# Patient Record
Sex: MALE | Race: WHITE | NOT HISPANIC OR LATINO | Employment: UNEMPLOYED | URBAN - METROPOLITAN AREA
[De-identification: names, ages, dates, MRNs, and addresses within clinical notes are randomized per-mention and may not be internally consistent; named-entity substitution may affect disease eponyms.]

---

## 2019-02-12 ENCOUNTER — OFFICE VISIT (OUTPATIENT)
Dept: FAMILY MEDICINE | Facility: CLINIC | Age: 39
End: 2019-02-12
Payer: COMMERCIAL

## 2019-02-12 VITALS
WEIGHT: 209 LBS | RESPIRATION RATE: 14 BRPM | SYSTOLIC BLOOD PRESSURE: 122 MMHG | HEIGHT: 71 IN | DIASTOLIC BLOOD PRESSURE: 85 MMHG | BODY MASS INDEX: 29.26 KG/M2 | OXYGEN SATURATION: 97 % | TEMPERATURE: 97.6 F | HEART RATE: 71 BPM

## 2019-02-12 DIAGNOSIS — Z00.00 ENCOUNTER FOR ROUTINE ADULT HEALTH EXAMINATION WITHOUT ABNORMAL FINDINGS: Primary | ICD-10-CM

## 2019-02-12 DIAGNOSIS — F43.21 ADJUSTMENT DISORDER WITH DEPRESSED MOOD: ICD-10-CM

## 2019-02-12 DIAGNOSIS — Z80.0 FAMILY HISTORY OF COLON CANCER: ICD-10-CM

## 2019-02-12 DIAGNOSIS — Z23 VACCINE FOR DIPHTHERIA-TETANUS-PERTUSSIS, COMBINED: ICD-10-CM

## 2019-02-12 DIAGNOSIS — J30.1 SEASONAL ALLERGIC RHINITIS DUE TO POLLEN: ICD-10-CM

## 2019-02-12 PROBLEM — J30.2 SEASONAL ALLERGIC RHINITIS: Status: ACTIVE | Noted: 2019-02-12

## 2019-02-12 PROCEDURE — 80061 LIPID PANEL: CPT | Performed by: FAMILY MEDICINE

## 2019-02-12 PROCEDURE — 90715 TDAP VACCINE 7 YRS/> IM: CPT | Performed by: FAMILY MEDICINE

## 2019-02-12 PROCEDURE — 36415 COLL VENOUS BLD VENIPUNCTURE: CPT | Performed by: FAMILY MEDICINE

## 2019-02-12 PROCEDURE — 83721 ASSAY OF BLOOD LIPOPROTEIN: CPT | Performed by: FAMILY MEDICINE

## 2019-02-12 PROCEDURE — 80053 COMPREHEN METABOLIC PANEL: CPT | Performed by: FAMILY MEDICINE

## 2019-02-12 PROCEDURE — 90471 IMMUNIZATION ADMIN: CPT | Performed by: FAMILY MEDICINE

## 2019-02-12 PROCEDURE — 99386 PREV VISIT NEW AGE 40-64: CPT | Mod: 25 | Performed by: FAMILY MEDICINE

## 2019-02-12 ASSESSMENT — MIFFLIN-ST. JEOR: SCORE: 1872.21

## 2019-02-12 NOTE — LETTER
March 1, 2019      Yg PIKE Tamicashanawiak  [address]   [facility] [address]        Dear ,    We are writing to inform you of your test results.    -LDL(bad) cholesterol level is elevated, HDL(good) cholesterol level is low and your triglycerides are elevated which can increase your heart disease risk.  A diet high in fat and simple carbohydrates, genetics and being overweight can contribute to this. ADVISE: exercising 150 minutes of aerobic exercise per week (30 minutes for 5 days per week or 50 minutes for 3 days per week are options), eating a low saturated fat/low carbohydrate diet, and omega-3 fatty acids (fish oil) 8427-3508 mg daily are helpful to improve this. In 6 months, you should recheck your fasting cholesterol panel by scheduling a lab-only appointment.   -Liver and gallbladder tests are normal (ALT,AST, Alk phos, bilirubin), kidney function is normal (Cr, GFR), sodium is normal, potassium is normal, calcium is normal, glucose is normal.     Resulted Orders   Lipid panel reflex to direct LDL Fasting   Result Value Ref Range    Cholesterol 220 (H) <200 mg/dL      Comment:      Desirable:       <200 mg/dl    Triglycerides 408 (H) <150 mg/dL      Comment:      Borderline high:  150-199 mg/dl  High:             200-499 mg/dl  Very high:       >499 mg/dl  Fasting specimen      HDL Cholesterol 30 (L) >39 mg/dL    LDL Cholesterol Calculated  <100 mg/dL     Cannot estimate LDL when triglyceride exceeds 400 mg/dL    Non HDL Cholesterol 190 (H) <130 mg/dL      Comment:      Above Desirable:  130-159 mg/dl  Borderline high:  160-189 mg/dl  High:             190-219 mg/dl  Very high:       >219 mg/dl     Comprehensive metabolic panel (BMP + Alb, Alk Phos, ALT, AST, Total. Bili, TP)   Result Value Ref Range    Sodium 138 133 - 144 mmol/L    Potassium 4.5 3.4 - 5.3 mmol/L    Chloride 106 94 - 109 mmol/L    Carbon Dioxide 28 20 - 32 mmol/L    Anion Gap 4 3 - 14 mmol/L    Glucose 99 70 - 99  mg/dL      Comment:      Fasting specimen    Urea Nitrogen 11 7 - 30 mg/dL    Creatinine 0.88 0.66 - 1.25 mg/dL    GFR Estimate >90 >60 mL/min/[1.73_m2]      Comment:      Non  GFR Calc  Starting 12/18/2018, serum creatinine based estimated GFR (eGFR) will be   calculated using the Chronic Kidney Disease Epidemiology Collaboration   (CKD-EPI) equation.      GFR Estimate If Black >90 >60 mL/min/[1.73_m2]      Comment:       GFR Calc  Starting 12/18/2018, serum creatinine based estimated GFR (eGFR) will be   calculated using the Chronic Kidney Disease Epidemiology Collaboration   (CKD-EPI) equation.      Calcium 9.1 8.5 - 10.1 mg/dL    Bilirubin Total 0.5 0.2 - 1.3 mg/dL    Albumin 4.4 3.4 - 5.0 g/dL    Protein Total 7.7 6.8 - 8.8 g/dL    Alkaline Phosphatase 97 40 - 150 U/L    ALT 27 0 - 70 U/L    AST 21 0 - 45 U/L   LDL cholesterol direct   Result Value Ref Range    LDL Cholesterol Direct 130 (H) <100 mg/dL      Comment:      Above desirable:  100-129 mg/dl  Borderline High:  130-159 mg/dL  High:             160-189 mg/dL  Very high:       >189 mg/dl         If you have any questions or concerns, please call the clinic at the number listed above.       Sincerely,        Madelin Rm, DO

## 2019-02-12 NOTE — LETTER
My Depression Action Plan  Name: Yg Garza   Date of Birth 1980  Date: 2/12/2019    My doctor: No primary care provider on file.   My clinic: [facility]  [address] [place] [id number] [phone]          GREEN    ZONE   Good Control    What it looks like:     Things are going generally well. You have normal up s and down s. You may even feel depressed from time to time, but bad moods usually last less than a day.   What you need to do:  1. Continue to care for yourself (see self care plan)  2. Check your depression survival kit and update it as needed  3. Follow your physician s recommendations including any medication.  4. Do not stop taking medication unless you consult with your physician first.           YELLOW         ZONE Getting Worse    What it looks like:     Depression is starting to interfere with your life.     It may be hard to get out of bed; you may be starting to isolate yourself from others.    Symptoms of depression are starting to last most all day and this has happened for several days.     You may have suicidal thoughts but they are not constant.   What you need to do:     1. Call your care team, your response to treatment will improve if you keep your care team informed of your progress. Yellow periods are signs an adjustment may need to be made.     2. Continue your self-care, even if you have to fake it!    3. Talk to someone in your support network    4. Open up your depression survival kit           RED    ZONE Medical Alert - Get Help    What it looks like:     Depression is seriously interfering with your life.     You may experience these or other symptoms: You can t get out of bed most days, can t work or engage in other necessary activities, you have trouble taking care of basic hygiene, or basic responsibilities, thoughts of suicide or death that will not go away, self-injurious behavior.     What you need to  do:  1. Call your care team and request a same-day appointment. If they are not available (weekends or after hours) call your local crisis line, emergency room or 911.            Depression Self Care Plan / Survival Kit    Self-Care for Depression  Here s the deal. Your body and mind are really not as separate as most people think.  What you do and think affects how you feel and how you feel influences what you do and think. This means if you do things that people who feel good do, it will help you feel better.  Sometimes this is all it takes.  There is also a place for medication and therapy depending on how severe your depression is, so be sure to consult with your medical provider and/ or Behavioral Health Consultant if your symptoms are worsening or not improving.     In order to better manage my stress, I will:    Exercise  Get some form of exercise, every day. This will help reduce pain and release endorphins, the  feel good  chemicals in your brain. This is almost as good as taking antidepressants!  This is not the same as joining a gym and then never going! (they count on that by the way ) It can be as simple as just going for a walk or doing some gardening, anything that will get you moving.      Hygiene   Maintain good hygiene (Get out of bed in the morning, Make your bed, Brush your teeth, Take a shower, and Get dressed like you were going to work, even if you are unemployed).  If your clothes don't fit try to get ones that do.    Diet  I will strive to eat foods that are good for me, drink plenty of water, and avoid excessive sugar, caffeine, alcohol, and other mood-altering substances.  Some foods that are helpful in depression are: complex carbohydrates, B vitamins, flaxseed, fish or fish oil, fresh fruits and vegetables.    Psychotherapy  I agree to participate in Individual Therapy (if recommended).    Medication  If prescribed medications, I agree to take them.  Missing doses can result in serious  side effects.  I understand that drinking alcohol, or other illicit drug use, may cause potential side effects.  I will not stop my medication abruptly without first discussing it with my provider.    Staying Connected With Others  I will stay in touch with my friends, family members, and my primary care provider/team.    Use your imagination  Be creative.  We all have a creative side; it doesn t matter if it s oil painting, sand castles, or mud pies! This will also kick up the endorphins.    Witness Beauty  (AKA stop and smell the roses) Take a look outside, even in mid-winter. Notice colors, textures. Watch the squirrels and birds.     Service to others  Be of service to others.  There is always someone else in need.  By helping others we can  get out of ourselves  and remember the really important things.  This also provides opportunities for practicing all the other parts of the program.    Humor  Laugh and be silly!  Adjust your TV habits for less news and crime-drama and more comedy.    Control your stress  Try breathing deep, massage therapy, biofeedback, and meditation. Find time to relax each day.     My support system    Clinic Contact:  Phone number:    Contact 1:  Phone number:    Contact 2:  Phone number:    Worship/:  Phone number:    Therapist:  Phone number:    Local crisis center:    Phone number:    Other community support:  Phone number:

## 2019-02-12 NOTE — PROGRESS NOTES
SUBJECTIVE:   CC: Yg Garza is an 38 year old male who presents for preventative health visit.     Question 2/12/2019  1:57 PM CST   Please respond to the following questions regarding your annual exam.  These will be reviewed by your provider at the time of your appointment and will become a permanent part of your medical record.     Outside of work, how many days during the week do you exercise? 4-5 days/week   If you drink alcohol do you typically have greater than 3 drinks per day OR greater than 7 drinks per week? No   Do you get at least 3 servings of foods that have calcium each day (dairy, green leafy vegetables, etc)? No   Do you have a special diet?  breakfast skipped   Do you have any problems taking medications regularly? No   Side effects from medication: None   Have you had an eye exam in the past two years? No   Do you see a dentist twice per year? No   Do you have sleep apnea, excessive snoring or daytime drowsiness? Excessive snoring   Over the past 2 weeks, how often have you been bothered by any of the following problems?    Q1: Little interest or pleasure in doing things More than half the days   Q2: Feeling down, depressed or hopeless Nearly every day   Do you have any additional concerns to address? No   PHQ-2 Score (range: 0 - 6) 5   Z Fv Branch Annual Exercise Outside Of Work     Question 2/12/2019  1:56 PM CST   Outside of work, approximately how many minutes a day do you exercise? 30-45 minutes   Phq-9 (Phq-9)-Developed By Kiah Anderson,Sandra Samuel,Anoop Lacy And Colleagues,With An Educational Bentley From [employer] 2002.     Question 2/12/2019  1:58 PM CST   Over the last 2 weeks, how often have you been bothered by any of the following problems?    1. Little interest or pleasure in doing things More than half the days   2.  Feeling down, depressed, or hopeless Nearly every day   3.  Trouble falling or staying asleep, or sleeping too much More than half the days    4.  Feeling tired or having little energy Not at all   5.  Poor appetite or overeating More than half the days   6.  Feeling bad about yourself - or that you are a failure or have let yourself or your family down Nearly every day   7.  Trouble concentrating on things, such as reading the newspaper or watching television Not at all   8.  Moving or speaking so slowly that other people could have noticed. Or the opposite - being so fidgety or restless that you have been moving around a lot more than usual Not at all   9.  Thoughts that you would be better off dead, or of hurting yourself in some way Nearly every day   If you checked off any problems, how difficult have these problems made it for you to do your work, take care of things at home, or get along with other people? Somewhat difficult   PHQ9 TOTAL SCORE (range: 0 - 27) 15 (Moderately severe depression)   Z Fv Branch Suicidal Thoughts     Question 2/12/2019  1:58 PM CST   In the past two weeks have you had thoughts of suicide or self harm? No   Do you have concerns about your personal safety or the safety of others? No     Question 2/12/2019  1:57 PM CST   Please respond to the following questions regarding your annual exam.  These will be reviewed by your provider at the time of your appointment and will become a permanent part of your medical record.     Outside of work, how many days during the week do you exercise? 4-5 days/week   If you drink alcohol do you typically have greater than 3 drinks per day OR greater than 7 drinks per week? No   Do you get at least 3 servings of foods that have calcium each day (dairy, green leafy vegetables, etc)? No   Do you have a special diet?  breakfast skipped   Do you have any problems taking medications regularly? No   Side effects from medication: None   Have you had an eye exam in the past two years? No   Do you see a dentist twice per year? No   Do you have sleep apnea, excessive snoring or daytime drowsiness?  Excessive snoring   Over the past 2 weeks, how often have you been bothered by any of the following problems?    Q1: Little interest or pleasure in doing things More than half the days   Q2: Feeling down, depressed or hopeless Nearly every day   Do you have any additional concerns to address? No   PHQ-2 Score (range: 0 - 6) 5   Z Fv Branch Annual Exercise Outside Of Work     Question 2/12/2019  1:56 PM CST   Outside of work, approximately how many minutes a day do you exercise? 30-45 minutes     -------------------------------------    Today's PHQ-2 Score:   PHQ-2 ( 1999 [employer]) 2/12/2019   Q1: Little interest or pleasure in doing things More than half the days   Q2: Feeling down, depressed or hopeless Nearly every day   PHQ-2 Score 5       Abuse: Current or Past(Physical, Sexual or Emotional)- No  Do you feel safe in your environment? Yes    Social History     Tobacco Use     Smoking status: Current Every Day Smoker     Types: Cigarettes     Smokeless tobacco: Never Used   Substance Use Topics     Alcohol use: Yes     Comment: 6 beers a month     No flowsheet data found.    Last PSA: No results found for: PSA    Reviewed orders with patient. Reviewed health maintenance and updated orders accordingly - Yes  Patient Active Problem List   Diagnosis     Seasonal allergic rhinitis     Family history of colon cancer     Past Surgical History:   Procedure Laterality Date     testicular torsion surgery  2004       Social History     Tobacco Use     Smoking status: Current Every Day Smoker     Types: Cigarettes     Smokeless tobacco: Never Used   Substance Use Topics     Alcohol use: Yes     Comment: 6 beers a month     Family History   Problem Relation Age of Onset     Cataracts Mother      Coronary Artery Disease Father         has pacemaker     Abdominal Aortic Aneurysm Father      Colon Cancer Paternal Grandfather         passed away in 60s           Reviewed and updated as needed this visit by clinical staff  Tobacco   "Allergies  Meds  Med Hx  Surg Hx  Fam Hx  Soc Hx        Reviewed and updated as needed this visit by Provider            Review of Systems  CONSTITUTIONAL: NEGATIVE for fever, chills, change in weight  INTEGUMENTARY/SKIN: NEGATIVE for worrisome rashes, moles or lesions  EYES: NEGATIVE for vision changes or irritation  ENT: NEGATIVE for ear, mouth and throat problems  RESP: NEGATIVE for significant cough or SOB  CV: NEGATIVE for chest pain, palpitations or peripheral edema  GI: NEGATIVE for nausea, abdominal pain, heartburn, or change in bowel habits   male: negative for dysuria, hematuria, decreased urinary stream, erectile dysfunction, urethral discharge  MUSCULOSKELETAL: NEGATIVE for significant arthralgias or myalgia  NEURO: NEGATIVE for weakness, dizziness or paresthesias  PSYCHIATRIC: NEGATIVE for changes in mood or affect    OBJECTIVE:   /85   Pulse 71   Temp 97.6  F (36.4  C) (Oral)   Resp 14   Ht 1.791 m (5' 10.5\")   Wt 94.8 kg (209 lb)   SpO2 97%   BMI 29.56 kg/m      Physical Exam  GENERAL: healthy, alert and no distress  EYES: Eyes grossly normal to inspection, PERRL and conjunctivae and sclerae normal  HENT: ear canals and TM's normal, nose and mouth without ulcers or lesions  NECK: no adenopathy, no asymmetry, masses, or scars and thyroid normal to palpation  RESP: lungs clear to auscultation - no rales, rhonchi or wheezes  CV: regular rate and rhythm, normal S1 S2, no S3 or S4, no murmur, click or rub, no peripheral edema and peripheral pulses strong  ABDOMEN: soft, nontender, no hepatosplenomegaly, no masses and bowel sounds normal  MS: no gross musculoskeletal defects noted, no edema  SKIN: no suspicious lesions or rashes  NEURO: Normal strength and tone, mentation intact and speech normal  PSYCH: mentation appears normal, affect normal/bright    Diagnostic Test Results:  Labs pending    ASSESSMENT/PLAN:   1. Encounter for routine adult health examination without abnormal " "findings     - Lipid panel reflex to direct LDL Fasting  - Comprehensive metabolic panel (BMP + Alb, Alk Phos, ALT, AST, Total. Bili, TP)    2. Seasonal allergic rhinitis due to pollen       3. Vaccine for diphtheria-tetanus-pertussis, combined  Given   - TDAP, IM (10 - 64 YRS) - Adacel    4. Adjustment disorder with depressed mood  Referral to therapist -   struggling with mood while out of work.  Has been difficult  - MENTAL HEALTH REFERRAL  - Adult; Outpatient Treatment; Individual/Couples/Family/Group Therapy/Health Psychology; G: [facility] [phone]; We will contact you to schedule the appointment or please call with any questions    5. Family history of colon cancer         COUNSELING:   Reviewed preventive health counseling, as reflected in patient instructions    BP Readings from Last 1 Encounters:   02/12/19 122/85     Estimated body mass index is 29.56 kg/m  as calculated from the following:    Height as of this encounter: 1.791 m (5' 10.5\").    Weight as of this encounter: 94.8 kg (209 lb).    BP Screening:   Last 3 BP Readings:    BP Readings from Last 3 Encounters:   02/12/19 122/85       The following was recommended to the patient:  Re-screen BP within a year and recommended lifestyle modifications  Weight management plan: Discussed healthy diet and exercise guidelines     reports that he has been smoking cigarettes.  he has never used smokeless tobacco.  Tobacco Cessation Action Plan: not addressed today    Counseling Resources:  ATP IV Guidelines  Pooled Cohorts Equation Calculator  FRAX Risk Assessment  ICSI Preventive Guidelines  Dietary Guidelines for Americans, 2010  USDA's MyPlate  ASA Prophylaxis  Lung CA Screening    Madelin Rm, DO  [facility]  "

## 2019-02-12 NOTE — NURSING NOTE
"Chief Complaint   Patient presents with     Physical       Initial /85   Pulse 71   Temp 97.6  F (36.4  C) (Oral)   Resp 14   Ht 1.791 m (5' 10.5\")   Wt 94.8 kg (209 lb)   SpO2 97%   BMI 29.56 kg/m   Estimated body mass index is 29.56 kg/m  as calculated from the following:    Height as of this encounter: 1.791 m (5' 10.5\").    Weight as of this encounter: 94.8 kg (209 lb).  BP completed using cuff size: large    Health Maintenance that is potentially due pending provider review:  Health Maintenance Due   Topic Date Due     PHQ-2 Q1 YR  07/22/1990     HIV SCREEN (SYSTEM ASSIGNED)  07/22/1996     DTAP/TDAP/TD IMMUNIZATION (1 - Tdap) 07/22/2003     LIPID SCREEN Q5 YR MALE (SYSTEM ASSIGNED)  07/22/2013     INFLUENZA VACCINE (1) 09/01/2018         Flu vaccine not done this yr  "

## 2019-02-13 LAB
ALBUMIN SERPL-MCNC: 4.4 G/DL (ref 3.4–5)
ALP SERPL-CCNC: 97 U/L (ref 40–150)
ALT SERPL W P-5'-P-CCNC: 27 U/L (ref 0–70)
ANION GAP SERPL CALCULATED.3IONS-SCNC: 4 MMOL/L (ref 3–14)
AST SERPL W P-5'-P-CCNC: 21 U/L (ref 0–45)
BILIRUB SERPL-MCNC: 0.5 MG/DL (ref 0.2–1.3)
BUN SERPL-MCNC: 11 MG/DL (ref 7–30)
CALCIUM SERPL-MCNC: 9.1 MG/DL (ref 8.5–10.1)
CHLORIDE SERPL-SCNC: 106 MMOL/L (ref 94–109)
CHOLEST SERPL-MCNC: 220 MG/DL
CO2 SERPL-SCNC: 28 MMOL/L (ref 20–32)
CREAT SERPL-MCNC: 0.88 MG/DL (ref 0.66–1.25)
GFR SERPL CREATININE-BSD FRML MDRD: >90 ML/MIN/{1.73_M2}
GLUCOSE SERPL-MCNC: 99 MG/DL (ref 70–99)
HDLC SERPL-MCNC: 30 MG/DL
LDLC SERPL CALC-MCNC: ABNORMAL MG/DL
LDLC SERPL DIRECT ASSAY-MCNC: 130 MG/DL
NONHDLC SERPL-MCNC: 190 MG/DL
POTASSIUM SERPL-SCNC: 4.5 MMOL/L (ref 3.4–5.3)
PROT SERPL-MCNC: 7.7 G/DL (ref 6.8–8.8)
SODIUM SERPL-SCNC: 138 MMOL/L (ref 133–144)
TRIGL SERPL-MCNC: 408 MG/DL

## 2019-03-01 NOTE — RESULT ENCOUNTER NOTE
Please send copy of results with a letter:    Enclosed is a copy of your results. If you have any questions, please contact us at [phone].      -LDL(bad) cholesterol level is elevated, HDL(good) cholesterol level is low and your triglycerides are elevated which can increase your heart disease risk.  A diet high in fat and simple carbohydrates, genetics and being overweight can contribute to this. ADVISE: exercising 150 minutes of aerobic exercise per week (30 minutes for 5 days per week or 50 minutes for 3 days per week are options), eating a low saturated fat/low carbohydrate diet, and omega-3 fatty acids (fish oil) 7121-9368 mg daily are helpful to improve this. In 6 months, you should recheck your fasting cholesterol panel by scheduling a lab-only appointment.  -Liver and gallbladder tests are normal (ALT,AST, Alk phos, bilirubin), kidney function is normal (Cr, GFR), sodium is normal, potassium is normal, calcium is normal, glucose is normal.    Thanks,   Madelin Rm DO

## 2021-05-08 ENCOUNTER — HEALTH MAINTENANCE LETTER (OUTPATIENT)
Age: 41
End: 2021-05-08

## 2021-10-23 ENCOUNTER — HEALTH MAINTENANCE LETTER (OUTPATIENT)
Age: 41
End: 2021-10-23

## 2022-06-04 ENCOUNTER — HEALTH MAINTENANCE LETTER (OUTPATIENT)
Age: 42
End: 2022-06-04

## 2022-10-09 ENCOUNTER — HEALTH MAINTENANCE LETTER (OUTPATIENT)
Age: 42
End: 2022-10-09

## 2023-01-15 ASSESSMENT — ENCOUNTER SYMPTOMS
DIARRHEA: 0
HEMATOCHEZIA: 0
MYALGIAS: 0
PARESTHESIAS: 0
CONSTIPATION: 0
EYE PAIN: 0
HEARTBURN: 0
DIZZINESS: 0
SHORTNESS OF BREATH: 0
COUGH: 0
WEAKNESS: 0
CHILLS: 0
FREQUENCY: 0
ABDOMINAL PAIN: 0
SORE THROAT: 0
HEMATURIA: 0
NAUSEA: 0
NERVOUS/ANXIOUS: 0
ARTHRALGIAS: 0
JOINT SWELLING: 0
FEVER: 0
DYSURIA: 0
PALPITATIONS: 0
HEADACHES: 0

## 2023-01-16 ENCOUNTER — OFFICE VISIT (OUTPATIENT)
Dept: FAMILY MEDICINE | Facility: CLINIC | Age: 43
End: 2023-01-16
Payer: COMMERCIAL

## 2023-01-16 VITALS
SYSTOLIC BLOOD PRESSURE: 127 MMHG | BODY MASS INDEX: 31.61 KG/M2 | TEMPERATURE: 98 F | OXYGEN SATURATION: 98 % | WEIGHT: 220.8 LBS | HEART RATE: 93 BPM | DIASTOLIC BLOOD PRESSURE: 91 MMHG | HEIGHT: 70 IN | RESPIRATION RATE: 18 BRPM

## 2023-01-16 DIAGNOSIS — Z11.4 SCREENING FOR HIV (HUMAN IMMUNODEFICIENCY VIRUS): ICD-10-CM

## 2023-01-16 DIAGNOSIS — Z00.00 ROUTINE GENERAL MEDICAL EXAMINATION AT A HEALTH CARE FACILITY: Primary | ICD-10-CM

## 2023-01-16 DIAGNOSIS — Z11.59 NEED FOR HEPATITIS C SCREENING TEST: ICD-10-CM

## 2023-01-16 DIAGNOSIS — Z87.891 EX-SMOKER: ICD-10-CM

## 2023-01-16 DIAGNOSIS — R03.0 ELEVATED BP WITHOUT DIAGNOSIS OF HYPERTENSION: ICD-10-CM

## 2023-01-16 DIAGNOSIS — L72.3 SEBACEOUS CYST: ICD-10-CM

## 2023-01-16 DIAGNOSIS — Z13.220 SCREENING FOR LIPID DISORDERS: ICD-10-CM

## 2023-01-16 DIAGNOSIS — S46.911A STRAIN OF RIGHT SHOULDER, INITIAL ENCOUNTER: ICD-10-CM

## 2023-01-16 DIAGNOSIS — E66.811 CLASS 1 OBESITY DUE TO EXCESS CALORIES WITHOUT SERIOUS COMORBIDITY WITH BODY MASS INDEX (BMI) OF 31.0 TO 31.9 IN ADULT: ICD-10-CM

## 2023-01-16 DIAGNOSIS — E66.09 CLASS 1 OBESITY DUE TO EXCESS CALORIES WITHOUT SERIOUS COMORBIDITY WITH BODY MASS INDEX (BMI) OF 31.0 TO 31.9 IN ADULT: ICD-10-CM

## 2023-01-16 PROCEDURE — 99213 OFFICE O/P EST LOW 20 MIN: CPT | Mod: 25 | Performed by: FAMILY MEDICINE

## 2023-01-16 PROCEDURE — 99386 PREV VISIT NEW AGE 40-64: CPT | Performed by: FAMILY MEDICINE

## 2023-01-16 ASSESSMENT — ENCOUNTER SYMPTOMS
DYSURIA: 0
FEVER: 0
ARTHRALGIAS: 0
FREQUENCY: 0
SHORTNESS OF BREATH: 0
ABDOMINAL PAIN: 0
COUGH: 0
MYALGIAS: 0
DIARRHEA: 0
EYE PAIN: 0
NERVOUS/ANXIOUS: 0
JOINT SWELLING: 0
HEMATURIA: 0
HEMATOCHEZIA: 0
CONSTIPATION: 0
NAUSEA: 0
DIZZINESS: 0
SORE THROAT: 0
PARESTHESIAS: 0
HEARTBURN: 0
WEAKNESS: 0
CHILLS: 0
HEADACHES: 0
PALPITATIONS: 0

## 2023-01-16 ASSESSMENT — PAIN SCALES - GENERAL: PAINLEVEL: MILD PAIN (2)

## 2023-01-16 NOTE — PROGRESS NOTES
SUBJECTIVE:   CC: Jordan is an 42 year old who presents for preventative health visit.     Patient has been advised of split billing requirements and indicates understanding: Yes  Healthy Habits:     Getting at least 3 servings of Calcium per day:  Yes    Bi-annual eye exam:  NO    Dental care twice a year:  Yes    Sleep apnea or symptoms of sleep apnea:  Excessive snoring    Diet:  Other    Frequency of exercise:  2-3 days/week    Duration of exercise:  15-30 minutes    Taking medications regularly:  Yes    Medication side effects:  Not applicable    PHQ-2 Total Score: 0  Right handed.  Dull ache/pain on right shoulder -unaware of acute injury but possibly had injured while opening a patio door back in Oct' 2022.  localized pain on posterior right shoulder.  Intermittent, worse if reaching back, if sleeping on the right shoulder.  No numbness tingling sensation.  No rash or swelling.    He had a resolved lump on right occipital region he would like to look at.      Today's PHQ-2 Score:   PHQ-2 (  [employer]) 1/15/2023   Q1: Little interest or pleasure in doing things 0   Q2: Feeling down, depressed or hopeless 0   PHQ-2 Score 0   Q1: Little interest or pleasure in doing things Not at all   Q2: Feeling down, depressed or hopeless Not at all   PHQ-2 Score 0       Have you ever done Advance Care Planning? (For example, a Health Directive, POLST, or a discussion with a medical provider or your loved ones about your wishes):     Social History     Tobacco Use     Smoking status: Former     Types: Cigarettes     Quit date: 2022     Years since quittin.0     Smokeless tobacco: Never   Substance Use Topics     Alcohol use: Yes     Comment: 6 beers a month     If you drink alcohol do you typically have >3 drinks per day or >7 drinks per week? No    No flowsheet data found.    Last PSA: No results found for: PSA    Reviewed orders with patient. Reviewed health maintenance and updated orders accordingly - Yes  BP  "Readings from Last 3 Encounters:   01/16/23 (!) 127/91   02/12/19 122/85    Wt Readings from Last 3 Encounters:   01/16/23 100.2 kg (220 lb 12.8 oz)   02/12/19 94.8 kg (209 lb)                    Reviewed and updated as needed this visit by clinical staff   Tobacco  Allergies  Meds  Problems  Med Hx  Surg Hx  Fam Hx          Reviewed and updated as needed this visit by Provider   Tobacco  Allergies  Meds  Problems  Med Hx  Surg Hx  Fam Hx             Review of Systems   Constitutional: Negative for chills and fever.   HENT: Negative for congestion, ear pain, hearing loss and sore throat.    Eyes: Negative for pain and visual disturbance.   Respiratory: Negative for cough and shortness of breath.    Cardiovascular: Negative for chest pain, palpitations and peripheral edema.   Gastrointestinal: Negative for abdominal pain, constipation, diarrhea, heartburn, hematochezia and nausea.   Genitourinary: Negative for dysuria, frequency, genital sores, hematuria, impotence, penile discharge and urgency.   Musculoskeletal: Negative for arthralgias, joint swelling and myalgias.   Skin: Negative for rash.   Neurological: Negative for dizziness, weakness, headaches and paresthesias.   Psychiatric/Behavioral: Negative for mood changes. The patient is not nervous/anxious.          OBJECTIVE:   BP (!) 127/91   Pulse 93   Temp 98  F (36.7  C) (Temporal)   Resp 18   Ht 1.789 m (5' 10.43\")   Wt 100.2 kg (220 lb 12.8 oz)   SpO2 98%   BMI 31.29 kg/m      Physical Exam  GENERAL: healthy, alert and no distress  EYES: Eyes grossly normal to inspection, PERRL and conjunctivae and sclerae normal  HENT: ear canals and TM's normal, nose and mouth without ulcers or lesions  NECK: no adenopathy, no asymmetry, masses, or scars and thyroid normal to palpation  RESP: lungs clear to auscultation - no rales, rhonchi or wheezes  CV: regular rate and rhythm, normal S1 S2, no S3 or S4, no murmur, click or rub, no peripheral edema " and peripheral pulses strong  ABDOMEN: soft, nontender, no hepatosplenomegaly, no masses and bowel sounds normal  MS: no gross musculoskeletal defects noted, no edema  SKIN: no suspicious lesions or rashes  NEURO: Normal strength and tone, mentation intact and speech normal  PSYCH: mentation appears normal, affect normal/bright    Diagnostic Test Results:  Labs reviewed in Epic  No results found for this or any previous visit (from the past 24 hour(s)).    ASSESSMENT/PLAN:   Yg was seen today for physical.    Diagnoses and all orders for this visit:    Routine general medical examination at a health care facility    Strain of right shoulder, initial encounter  Plan.  Possibly mild rotator cuff injury.  Start physical therapy.  Avoid reaching overhead or repetitive activity, avoid sleeping on the affected shoulder.  Over-the-counter ibuprofen 4 to 600 mg with meals as needed for next 2 days.  Follow-up in clinic for unresolved concerns in 4 to 6 weeks.  -     Physical Therapy Referral; Future    Elevated BP without diagnosis of hypertension  -     Basic metabolic panel  (Ca, Cl, CO2, Creat, Gluc, K, Na, BUN); Future  Advised to monitor blood pressure on her own.  If recheck blood pressure even at home is more than 140/90 over the course of time specially after lifestyle changes as reiterated and patient instruction office visit or telephone only visit is recommended to review blood pressure and if need start medication for blood pressure more than 140/90.    Ex-smoker  Congratulated him on his successful smoke cessation.  Class 1 obesity due to excess calories without serious comorbidity with body mass index (BMI) of 31.0 to 31.9 in adult  Lifestyle changes to reiterated      Sebaceous cyst  Comments:  right occipital region  Increasing in size excisional biopsy is recommended.      Screening for lipid disorders  -     Lipid panel reflex to direct LDL Fasting; Future    Need for hepatitis C screening test  -      Hepatitis C Screen Reflex to HCV RNA Quant and Genotype; Future    Screening for HIV (human immunodeficiency virus)  -     HIV Antigen Antibody Combo; Future    Other orders  -     REVIEW OF HEALTH MAINTENANCE PROTOCOL ORDERS  -     Cancel: Pneumococcal 20 Valent Conjugate (Prevnar 20)        Patient has been advised of split billing requirements and indicates understanding: Yes      COUNSELING:   Reviewed preventive health counseling, as reflected in patient instructions       Regular exercise       Healthy diet/nutrition       Vision screening       Hearing screening       Safe sex practices/STD prevention       Consider Hep C screening for all patients one time for ages 18-79 years       HIV screeninx in teen years, 1x in adult years, and at intervals if high risk        He reports that he quit smoking about a year ago. His smoking use included cigarettes. He has never used smokeless tobacco.  Nicotine/Tobacco Cessation Plan:   Information offered: Patient not interested at this time        Monika Jovel MD  [facility]

## 2023-01-16 NOTE — PATIENT INSTRUCTIONS
-Normal  BP is  119/79 or lower. Upper number is systolic Blood pressure (SBP). Lower number is diastolic  Blood pressure (DBP)  -Blood pressure between 120-139/80-89 (prehypertensive blood pressure/ class 1 hypertension )   -Hypertensive is  BP of 140/90 or above and needs treatment with medication.  -life style changes that are beneficial to reach goal of normal Blood pressure   1.Weight loss of 1 kg can reduce systolic Blood pressure  (SBP) by 1 mmHg  2.Health healthy diet (eg DASH dietary pattern can reduce SBP by 11 mmHg)  3.Structured excercise program (150 mins aerobic activity/week can reduce SBP by 5 mmHg)  4.Low salt  diet - very important.(eg: cut by 255 or 1000 mg/day to reduce SBP by 5mmHg)  5. Increase 4-5 serving of fresh vegetables & fruits /day- good source of potassium.        if More than 140/90  after a month of above life style changes  Return visit with MD/provider  for recheck & consideration of medications .       Preventive Health Recommendations  Male Ages 40 to 49    Yearly exam:             See your health care provider every year in order to  o   Review health changes.   o   Discuss preventive care.    o   Review your medicines if your doctor has prescribed any.  You should be tested each year for STDs (sexually transmitted diseases) if you re at risk.   Have a cholesterol test every 5 years.   Have a colonoscopy (test for colon cancer) if someone in your family has had colon cancer or polyps before age 50.   After age 45, have a diabetes test (fasting glucose). If you are at risk for diabetes, you should have this test every 3 years.    Talk with your health care provider about whether or not a prostate cancer screening test (PSA) is right for you.    Shots: Get a flu shot each year. Get a tetanus shot every 10 years.     Nutrition:  Eat at least 5 servings of fruits and vegetables daily.   Eat whole-grain bread, whole-wheat pasta and brown rice instead of white grains and rice.   Get  adequate Calcium and Vitamin D.     Lifestyle  Exercise for at least 150 minutes a week (30 minutes a day, 5 days a week). This will help you control your weight and prevent disease.   Limit alcohol to one drink per day.   No smoking.   Wear sunscreen to prevent skin cancer.   See your dentist every six months for an exam and cleaning.

## 2023-01-18 ENCOUNTER — THERAPY VISIT (OUTPATIENT)
Dept: PHYSICAL THERAPY | Facility: CLINIC | Age: 43
End: 2023-01-18
Attending: FAMILY MEDICINE
Payer: COMMERCIAL

## 2023-01-18 DIAGNOSIS — S46.911A STRAIN OF RIGHT SHOULDER, INITIAL ENCOUNTER: ICD-10-CM

## 2023-01-18 DIAGNOSIS — G89.29 CHRONIC RIGHT SHOULDER PAIN: ICD-10-CM

## 2023-01-18 DIAGNOSIS — M25.511 CHRONIC RIGHT SHOULDER PAIN: ICD-10-CM

## 2023-01-18 PROCEDURE — 97110 THERAPEUTIC EXERCISES: CPT | Mod: GP | Performed by: PHYSICAL THERAPIST

## 2023-01-18 PROCEDURE — 97161 PT EVAL LOW COMPLEX 20 MIN: CPT | Mod: GP | Performed by: PHYSICAL THERAPIST

## 2023-01-18 NOTE — LETTER
[facility] [address]  [address]  [facility] [id number] [phone]    2023    Re: Yg Garza   :   1980  MRN:  [id number]   REFERRING PHYSICIAN:   Monika VILCHIS [facility]    Date of Initial Evaluation:  23  Visits:  Rxs Used: 1  Reason for Referral:     Strain of right shoulder, initial encounter  Chronic right shoulder pain    EVALUATION SUMMARY    [place] [facility] Initial Evaluation -- Upper Extremity    Evaluation Date: 2023  Yg Garza is a 42 year old male with a R shoulder condition.   Referral: PCP  Work mechanical stresses: sitting, computer work  Employment status:     Leisure mechanical stresses: normal daily activities  Functional disability score (SPADI):   VAS score (0-10): 2/10  Handedness (R/L):  R  Patient goals/expectations:  To not have pain.      HISTORY    Present symptoms: R anterior shoulder pain.    Pain quality (sharp/shooting/stabbing/aching/burning/cramping):  aching  Present since (onset date):  10/18/2022    Symptoms (improving/unchanging/worsening):  unchanging.  Symptoms commenced as a result of: opening a patio door--leaning forward and trying to push a locked door forward--did not have pain at the time but noticed it the next day especially with reaching out with arms extended and lifting  Condition occurred in the following environment: home  Symptoms at onset: R shoulder pain--anterior  Paresthesia (yes/no):  no  Spinal history: no   Cough/Sneeze (pos/neg):  neg  Constant symptoms: R anterior shoulder  Intermittent symptoms: none          Re: Yg Garza   :   1980        Symptoms are worse with the following: lying on R side for sleep, reaching out with arms extended to lift something   Symptoms are better with the following: avoiding aggravating  movements  Continued use makes the pain (better/worse/no effect): worse  Disturbed night (yes/no):  yes  Pain at rest (yes/no): no  Site (neck/shoulder/elbow/wrist/hand):   Other questions (swelling/catching/clicking/locking/subluxing):  subluxing  Previous episodes: no  Previous treatments: none  Specific Questions:  General health (excellent/good/fair/poor):  fair  Pertinent medical history includes: High blood pressure  Medications (nil/NSAIDS/analg/steroids/anticoag/other):  None  Medical allergies:  none  Imaging (None/Xray/MRI/Other): none  Recent or major surgery (yes/no): no  Night pain (yes/no): no  Accidents (yes/no): no  Unexplained weight loss (yes/no): no  Barriers at home: no  Other red flags: no  Sites for physical examination (neck/shoulder/elbow/wrist/hand): R shoulder    EXAMINATION    Posture:  Sitting (good/fair/poor): poor  Correction of posture (better/worse/no effect/NA): NA--no pain in sitting  Standing (good/fair/poor): good  Other observations:  Rounded shoulders    Neurological (NA/motor/sensory/reflexes/dural): na    Baselines (pain or functional activity): R shoulder pain with lying on R side                                Re: Yg Garza   :   1980    Extremities (Shoulder/Elbow/Wrist/Hand): R shoulder    Movement Loss [place] Mod Min Nil Pain   Flexion    x NE   Extension    x NE   Abduction     PDM   Internal Rotation    x NE   External Rotation    x NE   Supination        Pronation        Radial Deviation        Ulnar Deviation           Passive Movement (+/- overpressure)/(PDM/ERP):  R shoulder PROM:  Flexion nil loss, mild pain overpressure; abduction nil loss, mild pain overpressure, IR and ER nil loss, NE; extension mild pain overpressure but nil loss  Resisted Test Response (pain): R shoulder strength:  Flexion 4+/5, NE; abduction 4+/5, NE; IR 5/5, NE; ER 5/5, NE  Other Tests:   Spine:  Movement Loss: Cervical AROM:  Nil loss any direction, NE  Effect of repeated  movements: na  Effect of static positioning: na  Spine testing (not relevant/relevant/secondary problem): not relevant    Baseline Symptoms: R shoulder pain in R SL, shoulder flexed and ER  Repeated Tests Symptom Response Mechanical Response   Active/Passive movement, resisted test, functional test During - Produce, Abolish, Increase, Decrease, NE After -   Better, Worse, NB, NW, NE Effect -   ? or ? ROM, strength or key functional test No Effect   Rep R shoulder horiz abd w/self-OP stretch NW ? Decreased pain in R SL    Rep R shoulder ER/abd stretch NW    Increased pain in R SL    Rep wand extension R shoulder No Effect    No Effect    Decreased pain in R SL           Effect of static positioning              Re: Yg GLENDY Garza   :   1980          Provisional Classification (Extremity/Spine): Extremity - Derangement      Principle of Management:  Education:  POC, treatment rationale, expected response  Equipment provided:  none  Exercise and dosage:  Repeated wand extension x10 reps, 4x/day; standing R shoulder extension strength 1x/day, scap ret/dep to work on posture    ASSESSMENT/PLAN:    Patient is a 42 year old male with right side shoulder complaints.  Provisional classification of shoulder derangement with directional preference for extension.  He had only mild pain today reproduced with R SL and decreased after repeated R shoulder extension exercises.  He will try at home to assess further.  Treatment will work on improving mechanics with repeated motions as helpful in addition to scapular and rotator cuff strengthening.       Patient has the following significant findings with corresponding treatment plan.                Diagnosis 1:  R shoulder pain  Pain -  self management, education, directional preference exercise and home program  Decreased function - therapeutic activities and home program  Impaired posture - neuro re-education and home program  Cumulative Therapy Evaluation is: Low  complexity.  Previous and current functional limitations:  (See Goal Flow Sheet for this information)    Short term and Long term goals: (See Goal Flow Sheet for this information)   Communication ability:  Patient appears to be able to clearly communicate and understand verbal and written communication and follow directions correctly.  Treatment Explanation - The following has been discussed with the patient:   RX ordered/plan of care  Anticipated outcomes  Possible risks and side effects  This patient would benefit from PT intervention to resume normal activities.   Rehab potential is good.  Frequency:  1 X week, once daily  Duration:  for 6 weeks  Discharge Plan:  Achieve all LTG.  Independent in home treatment program.  Reach maximal therapeutic benefit.    Thank you for your referral.    INQUIRIES  Therapist: Shawna Bryan PT  [facility] [address]  [address]  [facility] [address]  Phone: [phone]  Fax: [phone]

## 2023-01-18 NOTE — PROGRESS NOTES
[facility] Initial Evaluation -- Upper Extremity    Evaluation Date: January 18, 2023  Yg Garza is a 42 year old male with a R shoulder condition.   Referral: PCP  Work mechanical stresses: sitting, computer work  Employment status:     Leisure mechanical stresses: normal daily activities  Functional disability score (SPADI):   VAS score (0-10): 2/10  Handedness (R/L):  R  Patient goals/expectations:  To not have pain.      HISTORY    Present symptoms: R anterior shoulder pain.    Pain quality (sharp/shooting/stabbing/aching/burning/cramping):  aching    Present since (onset date):  10/18/2022    Symptoms (improving/unchanging/worsening):  unchanging.    Symptoms commenced as a result of: opening a patio door--leaning forward and trying to push a locked door forward--did not have pain at the time but noticed it the next day especially with reaching out with arms extended and lifting  Condition occurred in the following environment: home    Symptoms at onset: R shoulder pain--anterior  Paresthesia (yes/no):  no  Spinal history: no   Cough/Sneeze (pos/neg):  neg    Constant symptoms: R anterior shoulder  Intermittent symptoms: none    Symptoms are worse with the following: lying on R side for sleep, reaching out with arms extended to lift something   Symptoms are better with the following: avoiding aggravating movements    Continued use makes the pain (better/worse/no effect): worse    Disturbed night (yes/no):  yes    Pain at rest (yes/no): no  Site (neck/shoulder/elbow/wrist/hand):     Other questions (swelling/catching/clicking/locking/subluxing):  subluxing    Previous episodes: no  Previous treatments: none    Specific Questions:  General health (excellent/good/fair/poor):  fair  Pertinent medical history includes: High blood pressure  Medications (nil/NSAIDS/analg/steroids/anticoag/other):  None  Medical allergies:  none  Imaging (None/Xray/MRI/Other): none  Recent  or major surgery (yes/no): no  Night pain (yes/no): no  Accidents (yes/no): no  Unexplained weight loss (yes/no): no  Barriers at home: no  Other red flags: no    Sites for physical examination (neck/shoulder/elbow/wrist/hand): R shoulder    EXAMINATION    Posture:  Sitting (good/fair/poor): poor  Correction of posture (better/worse/no effect/NA): NA--no pain in sitting  Standing (good/fair/poor): good  Other observations:  Rounded shoulders    Neurological (NA/motor/sensory/reflexes/dural): na    Baselines (pain or functional activity): R shoulder pain with lying on R side    Extremities (Shoulder/Elbow/Wrist/Hand): R shoulder    Movement Loss [place] Mod Min Nil Pain   Flexion    x NE   Extension    x NE   Abduction     PDM   Internal Rotation    x NE   External Rotation    x NE   Supination        Pronation        Radial Deviation        Ulnar Deviation           Passive Movement (+/- overpressure)/(PDM/ERP):  R shoulder PROM:  Flexion nil loss, mild pain overpressure; abduction nil loss, mild pain overpressure, IR and ER nil loss, NE; extension mild pain overpressure but nil loss  Resisted Test Response (pain): R shoulder strength:  Flexion 4+/5, NE; abduction 4+/5, NE; IR 5/5, NE; ER 5/5, NE  Other Tests:     Spine:  Movement Loss: Cervical AROM:  Nil loss any direction, NE  Effect of repeated movements: na  Effect of static positioning: na  Spine testing (not relevant/relevant/secondary problem): not relevant    Baseline Symptoms: R shoulder pain in R SL, shoulder flexed and ER  Repeated Tests Symptom Response Mechanical Response   Active/Passive movement, resisted test, functional test During - Produce, Abolish, Increase, Decrease, NE After -   Better, Worse, NB, NW, NE Effect -   ? or ? ROM, strength or key functional test No Effect   Rep R shoulder horiz abd w/self-OP stretch NW ? Decreased pain in R SL    Rep R shoulder ER/abd stretch NW    Increased pain in R SL    Rep wand extension R shoulder No Effect     No Effect    Decreased pain in R SL           Effect of static positioning                  Provisional Classification (Extremity/Spine): Extremity - Derangement      Principle of Management:  Education:  POC, treatment rationale, expected response  Equipment provided:  none  Exercise and dosage:  Repeated wand extension x10 reps, 4x/day; standing R shoulder extension strength 1x/day, scap ret/dep to work on posture    ASSESSMENT/PLAN:    Patient is a 42 year old male with right side shoulder complaints.  Provisional classification of shoulder derangement with directional preference for extension.  He had only mild pain today reproduced with R SL and decreased after repeated R shoulder extension exercises.  He will try at home to assess further.  Treatment will work on improving mechanics with repeated motions as helpful in addition to scapular and rotator cuff strengthening.       Patient has the following significant findings with corresponding treatment plan.                Diagnosis 1:  R shoulder pain  Pain -  self management, education, directional preference exercise and home program  Decreased function - therapeutic activities and home program  Impaired posture - neuro re-education and home program    Cumulative Therapy Evaluation is: Low complexity.    Previous and current functional limitations:  (See Goal Flow Sheet for this information)    Short term and Long term goals: (See Goal Flow Sheet for this information)     Communication ability:  Patient appears to be able to clearly communicate and understand verbal and written communication and follow directions correctly.  Treatment Explanation - The following has been discussed with the patient:   RX ordered/plan of care  Anticipated outcomes  Possible risks and side effects  This patient would benefit from PT intervention to resume normal activities.   Rehab potential is good.    Frequency:  1 X week, once daily  Duration:  for 6 weeks  Discharge Plan:   Achieve all LTG.  Independent in home treatment program.  Reach maximal therapeutic benefit.    Please refer to the daily flowsheet for treatment today, total treatment time and time spent performing 1:1 timed codes.

## 2023-02-03 ENCOUNTER — THERAPY VISIT (OUTPATIENT)
Dept: PHYSICAL THERAPY | Facility: CLINIC | Age: 43
End: 2023-02-03
Payer: COMMERCIAL

## 2023-02-03 DIAGNOSIS — M25.511 CHRONIC RIGHT SHOULDER PAIN: Primary | ICD-10-CM

## 2023-02-03 DIAGNOSIS — G89.29 CHRONIC RIGHT SHOULDER PAIN: Primary | ICD-10-CM

## 2023-02-03 PROCEDURE — 97110 THERAPEUTIC EXERCISES: CPT | Mod: GP | Performed by: PHYSICAL THERAPIST

## 2023-04-03 NOTE — PROGRESS NOTES
Subjective:  HPI  Physical Exam                    Objective:  System    Physical Exam    General     ROS    Assessment/Plan:    DISCHARGE REPORT    Progress reporting period is from 01/18/2023 to 02/03/2023.       SUBJECTIVE  Subjective: Patient reports doing the exercises consistently but felt like it was getting worse as he noticed more pain with sleeping on his R side.  He also noticed pain with moving his R shoulder in certain directions but not predictable.  This week has been a little better and he has continued doing the exercises.  He has less pain pulling the cat box out of the closet.    Current Pain level:  As of 02/03/2023:  1/10.     Initial Pain level: 2/10.   Changes in function:  Unknown as patient did not return for additional follow-up visits.  Adverse reaction to treatment or activity: Unknown as patient did not return for additional follow-up visits.    OBJECTIVE  Objective:  As of 02/03/2023:  R shoulder AROM:  nil loss flexion, mild pain, abduction, IR/ext and ER without pain.  Mild pain resisted abduction and flexion.  Abolished after rep cerv ret/ext/t-ext in sitting--to add to home and do instead of wand shoulder extension--do 10 reps, every  2 hours.  Progressed scapular and rotator cuff strengthening for improved mechanics without issue.   Current objective measurements are unavailable as patient did not return for additional follow-up visits.      ASSESSMENT/PLAN  Patient was seen for 2 visits over a 1-week timeframe with treatment focusing on cervical retraction/extension exercises which abolished his pain at his last visit in addition to scapular and rotator cuff strengthening exercises.  He has not returned for additional follow-up visits so current assessment is unavailable.  Updated problem list and treatment plan: Diagnosis 1:  R shoulder pain   No updated problem list or treatment plan as patient did not return for additional visits and is discharged from PT at this time.     STG/LTGs have been met or progress has been made towards goals:  Unknown as patient did not return for additional follow-up visits.  Assessment of Progress: The patient has not returned to therapy. Current status is unknown.  Self Management Plans:  Patient has been instructed in a home treatment program.  Patient  has been instructed in self management of symptoms.  Yg continues to require the following intervention to meet STG and LTG's:  PT intervention is no longer required to meet STG/LTG.    Recommendations:  Patient is discharged from PT as he did not return for further follow-up visits.    Please refer to the daily flowsheet for treatment today, total treatment time and time spent performing 1:1 timed codes.

## 2024-03-16 ENCOUNTER — HEALTH MAINTENANCE LETTER (OUTPATIENT)
Age: 44
End: 2024-03-16

## 2025-01-22 SDOH — HEALTH STABILITY: PHYSICAL HEALTH: ON AVERAGE, HOW MANY DAYS PER WEEK DO YOU ENGAGE IN MODERATE TO STRENUOUS EXERCISE (LIKE A BRISK WALK)?: 2 DAYS

## 2025-01-22 SDOH — HEALTH STABILITY: PHYSICAL HEALTH: ON AVERAGE, HOW MANY MINUTES DO YOU ENGAGE IN EXERCISE AT THIS LEVEL?: 40 MIN

## 2025-01-22 ASSESSMENT — SOCIAL DETERMINANTS OF HEALTH (SDOH): HOW OFTEN DO YOU GET TOGETHER WITH FRIENDS OR RELATIVES?: PATIENT DECLINED

## 2025-01-23 ENCOUNTER — OFFICE VISIT (OUTPATIENT)
Dept: FAMILY MEDICINE | Facility: CLINIC | Age: 45
End: 2025-01-23
Payer: COMMERCIAL

## 2025-01-23 VITALS
OXYGEN SATURATION: 99 % | TEMPERATURE: 97.8 F | HEART RATE: 97 BPM | BODY MASS INDEX: 31.64 KG/M2 | WEIGHT: 221 LBS | RESPIRATION RATE: 18 BRPM | DIASTOLIC BLOOD PRESSURE: 86 MMHG | HEIGHT: 70 IN | SYSTOLIC BLOOD PRESSURE: 134 MMHG

## 2025-01-23 DIAGNOSIS — Z12.11 SCREEN FOR COLON CANCER: ICD-10-CM

## 2025-01-23 DIAGNOSIS — Z00.00 HEALTH MAINTENANCE EXAMINATION: Primary | ICD-10-CM

## 2025-01-23 LAB
ALBUMIN SERPL BCG-MCNC: 4.4 G/DL (ref 3.5–5.2)
ALP SERPL-CCNC: 100 U/L (ref 40–150)
ALT SERPL W P-5'-P-CCNC: 30 U/L (ref 0–70)
ANION GAP SERPL CALCULATED.3IONS-SCNC: 11 MMOL/L (ref 7–15)
AST SERPL W P-5'-P-CCNC: 29 U/L (ref 0–45)
BILIRUB SERPL-MCNC: 0.3 MG/DL
BUN SERPL-MCNC: 12.8 MG/DL (ref 6–20)
CALCIUM SERPL-MCNC: 9.9 MG/DL (ref 8.8–10.4)
CHLORIDE SERPL-SCNC: 100 MMOL/L (ref 98–107)
CHOLEST SERPL-MCNC: 222 MG/DL
CREAT SERPL-MCNC: 0.86 MG/DL (ref 0.67–1.17)
EGFRCR SERPLBLD CKD-EPI 2021: >90 ML/MIN/1.73M2
ERYTHROCYTE [DISTWIDTH] IN BLOOD BY AUTOMATED COUNT: 11.8 % (ref 10–15)
FASTING STATUS PATIENT QL REPORTED: YES
FASTING STATUS PATIENT QL REPORTED: YES
GLUCOSE SERPL-MCNC: 113 MG/DL (ref 70–99)
HCO3 SERPL-SCNC: 28 MMOL/L (ref 22–29)
HCT VFR BLD AUTO: 42.7 % (ref 40–53)
HDLC SERPL-MCNC: 23 MG/DL
HGB BLD-MCNC: 15.2 G/DL (ref 13.3–17.7)
LDLC SERPL CALC-MCNC: ABNORMAL MG/DL
LDLC SERPL DIRECT ASSAY-MCNC: 138 MG/DL
MCH RBC QN AUTO: 29.3 PG (ref 26.5–33)
MCHC RBC AUTO-ENTMCNC: 35.6 G/DL (ref 31.5–36.5)
MCV RBC AUTO: 82 FL (ref 78–100)
NONHDLC SERPL-MCNC: 199 MG/DL
PLATELET # BLD AUTO: 355 10E3/UL (ref 150–450)
POTASSIUM SERPL-SCNC: 4.5 MMOL/L (ref 3.4–5.3)
PROT SERPL-MCNC: 7.7 G/DL (ref 6.4–8.3)
RBC # BLD AUTO: 5.18 10E6/UL (ref 4.4–5.9)
SODIUM SERPL-SCNC: 139 MMOL/L (ref 135–145)
TRIGL SERPL-MCNC: 412 MG/DL
WBC # BLD AUTO: 11.5 10E3/UL (ref 4–11)

## 2025-01-23 ASSESSMENT — PAIN SCALES - GENERAL: PAINLEVEL_OUTOF10: NO PAIN (0)

## 2025-01-23 NOTE — PROGRESS NOTES
"Preventive Care Visit  [facility] GURDEEP Holley MD, Family Medicine  Jan 23, 2025      Assessment & Plan     Screen for colon cancer    - Colonoscopy Screening  Referral; Future    Health maintenance examination    - Lipid panel reflex to direct LDL Non-fasting; Future  - Colonoscopy Screening  Referral; Future  - CBC with platelets; Future  - Comprehensive metabolic panel (BMP + Alb, Alk Phos, ALT, AST, Total. Bili, TP); Future    Patient has been advised of split billing requirements and indicates understanding: Yes        BMI  Estimated body mass index is 31.29 kg/m  as calculated from the following:    Height as of this encounter: 1.79 m (5' 10.47\").    Weight as of this encounter: 100.2 kg (221 lb).   Weight management plan: Discussed healthy diet and exercise guidelines    Counseling  Appropriate preventive services were addressed with this patient via screening, questionnaire, or discussion as appropriate for fall prevention, nutrition, physical activity, Tobacco-use cessation, social engagement, weight loss and cognition.  Checklist reviewing preventive services available has been given to the patient.  Reviewed patient's diet, addressing concerns and/or questions.   He is at risk for lack of exercise and has been provided with information to increase physical activity for the benefit of his well-being.   The patient was instructed to see the dentist every 6 months.   He is at risk for psychosocial distress and has been provided with information to reduce risk.       FUTURE APPOINTMENTS:       - Follow-up visit in 1 year     Flor Ortega is a 44 year old, presenting for the following:  Physical        1/23/2025    11:21 AM   Additional Questions   Roomed by Sky          HPI    Health Care Directive  Patient does not have a Health Care Directive: Discussed advance care planning with patient; however, patient declined at this time.      1/22/2025   General " Health   How would you rate your overall physical health? Good   Feel stress (tense, anxious, or unable to sleep) To some extent   (!) STRESS CONCERN      2025   Nutrition   Three or more servings of calcium each day? Yes   Diet: Regular (no restrictions)   How many servings of fruit and vegetables per day? (!) 2-3   How many sweetened beverages each day? (!) 3         2025   Exercise   Days per week of moderate/strenous exercise 2 days   Average minutes spent exercising at this level 40 min   (!) EXERCISE CONCERN      2025   Social Factors   Frequency of gathering with friends or relatives Patient declined   Worry food won't last until get money to buy more No   Food not last or not have enough money for food? No   Do you have housing? (Housing is defined as stable permanent housing and does not include staying ouside in a car, in a tent, in an abandoned building, in an overnight shelter, or couch-surfing.) Yes   Are you worried about losing your housing? No   Lack of transportation? No   Unable to get utilities (heat,electricity)? No         2025   Dental   Dentist two times every year? (!) NO         2025   TB Screening   Were you born outside of the [place]? No         Today's PHQ-2 Score:       2025     3:26 PM   PHQ-2 (  [employer])   Q1: Little interest or pleasure in doing things 0   Q2: Feeling down, depressed or hopeless 0   PHQ-2 Score 0    Q1: Little interest or pleasure in doing things Not at all   Q2: Feeling down, depressed or hopeless Not at all   PHQ-2 Score 0       Patient-reported           2025   Substance Use   Alcohol more than 3/day or more than 7/wk Not Applicable   Do you use any other substances recreationally? (!) CANNABIS PRODUCTS     Social History     Tobacco Use    Smoking status: Former     Current packs/day: 0.00     Types: Cigarettes     Quit date: 2022     Years since quittin.9    Smokeless tobacco: Never   Substance Use Topics    Alcohol use:  Not Currently     Comment: 6 beers a month    Drug use: Yes     Types: Marijuana     Comment: 3x week             2025   One time HIV Screening   Previous HIV test? I don't know         2025   STI Screening   New sexual partner(s) since last STI/HIV test? No   ASCVD Risk   The ASCVD Risk score (Adithya ROLAND, et al., 2019) failed to calculate for the following reasons:    Cannot find a previous HDL lab    Cannot find a previous total cholesterol lab        2025   Contraception/Family Planning   Questions about contraception or family planning No        Reviewed and updated as needed this visit by Provider                    No past medical history on file.  Past Surgical History:   Procedure Laterality Date    testicular torsion surgery       Labs reviewed in EPIC  BP Readings from Last 3 Encounters:   25 134/86   23 (!) 127/91   19 122/85    Wt Readings from Last 3 Encounters:   25 100.2 kg (221 lb)   23 100.2 kg (220 lb 12.8 oz)   19 94.8 kg (209 lb)                  Patient Active Problem List   Diagnosis    Seasonal allergic rhinitis    Family history of colon cancer    Chronic right shoulder pain     Past Surgical History:   Procedure Laterality Date    testicular torsion surgery         Social History     Tobacco Use    Smoking status: Former     Current packs/day: 0.00     Types: Cigarettes     Quit date: 2022     Years since quittin.9    Smokeless tobacco: Never   Substance Use Topics    Alcohol use: Not Currently     Comment: 6 beers a month     Family History   Problem Relation Age of Onset    Cataracts Mother     Coronary Artery Disease Father         has pacemaker    Abdominal Aortic Aneurysm Father     Colon Cancer Paternal Grandfather         passed away in 60s         No current outpatient medications on file.     No Known Allergies  Recent Labs   Lab Test 19  1448   LDL Cannot estimate LDL when triglyceride exceeds 400  "mg/dL  130*   HDL 30*   TRIG 408*   ALT 27   CR 0.88   GFRESTIMATED >90   GFRESTBLACK >90   POTASSIUM 4.5          Review of Systems  Constitutional, HEENT, cardiovascular, pulmonary, GI, , musculoskeletal, neuro, skin, endocrine and psych systems are negative, except as otherwise noted.     Objective    Exam  /86   Pulse 97   Temp 97.8  F (36.6  C) (Temporal)   Resp 18   Ht 1.79 m (5' 10.47\")   Wt 100.2 kg (221 lb)   SpO2 99%   BMI 31.29 kg/m     Estimated body mass index is 31.29 kg/m  as calculated from the following:    Height as of this encounter: 1.79 m (5' 10.47\").    Weight as of this encounter: 100.2 kg (221 lb).    Physical Exam  GENERAL: alert and no distress  EYES: Eyes grossly normal to inspection, PERRL and conjunctivae and sclerae normal  HENT: ear canals and TM's normal, nose and mouth without ulcers or lesions  NECK: no adenopathy, no asymmetry, masses, or scars  RESP: lungs clear to auscultation - no rales, rhonchi or wheezes  CV: regular rate and rhythm, normal S1 S2, no S3 or S4, no murmur, click or rub, no peripheral edema  ABDOMEN: soft, nontender, no hepatosplenomegaly, no masses and bowel sounds normal  MS: no gross musculoskeletal defects noted, no edema  SKIN: no suspicious lesions or rashes  NEURO: Normal strength and tone, mentation intact and speech normal  BACK: no CVA tenderness, no paralumbar tenderness  PSYCH: mentation appears normal, affect normal/bright  LYMPH: no cervical, supraclavicular, axillary, or inguinal adenopathy        Signed Electronically by: Olaf Holley MD    "

## 2025-02-06 ENCOUNTER — TELEPHONE (OUTPATIENT)
Dept: GASTROENTEROLOGY | Facility: CLINIC | Age: 45
End: 2025-02-06
Payer: COMMERCIAL

## 2025-03-19 ENCOUNTER — TELEPHONE (OUTPATIENT)
Dept: GASTROENTEROLOGY | Facility: CLINIC | Age: 45
End: 2025-03-19
Payer: COMMERCIAL

## 2025-03-19 NOTE — TELEPHONE ENCOUNTER
Pre visit planning completed.      Procedure details:    Patient scheduled for Colonoscopy on 4-4-25.     Arrival time: 0930. Procedure time 1015    Facility location: [facility]; [facility] [place] [address]. Check in location: 1st [facility].     Sedation type: Conscious sedation     Pre op exam needed? No.    Indication for procedure: screening       Chart review:     Electronic implanted devices? No    Recent diagnosis of diverticulitis within the last 6 weeks? No      Medication review:    Diabetic? No    Anticoagulants? No    Weight loss medication/injectable? No GLP-1 medication per patient's medication list. Nursing to verify with pre-assessment call.    Other medication HOLDING recommendations:  Cannabis/Marijuana: Stop night before procedure.      Prep for procedure:     Bowel prep recommendation: Standard Miralax.   Due to: standard bowel prep    Procedure information and instructions sent via [employer]         Casie Decker RN  Endoscopy Procedure Pre Assessment   [phone] option 3

## 2025-03-20 NOTE — TELEPHONE ENCOUNTER
Pre assessment completed for upcoming procedure.   (Please see previous telephone encounter notes for complete details)    Patient returned call.       Procedure details:    Arrival time and facility location reviewed.    Pre op exam needed? No.    Designated  policy reviewed. Instructed to have someone stay 6  hours post procedure.       Medication review:    Medications reviewed. Please see supporting documentation below. Holding recommendations discussed (if applicable).       Prep for procedure:     Procedure prep instructions reviewed.        Any additional information needed:  N/A      Patient verbalized understanding and had no questions or concerns at this time.      Korina Noble RN  Endoscopy Procedure Pre Assessment   [phone] option 3

## 2025-03-20 NOTE — TELEPHONE ENCOUNTER
Attempted to contact patient in order to complete pre assessment questions.     No answer. Left message to return call to [phone] option 3.    Callback communication sent via [employer].    Chio Villarreal RN

## 2025-04-04 ENCOUNTER — HOSPITAL ENCOUNTER (OUTPATIENT)
Facility: CLINIC | Age: 45
Discharge: HOME | End: 2025-04-04
Attending: COLON & RECTAL SURGERY | Admitting: COLON & RECTAL SURGERY
Payer: COMMERCIAL

## 2025-04-04 VITALS
WEIGHT: 224 LBS | BODY MASS INDEX: 31.36 KG/M2 | DIASTOLIC BLOOD PRESSURE: 100 MMHG | OXYGEN SATURATION: 95 % | HEIGHT: 71 IN | HEART RATE: 80 BPM | SYSTOLIC BLOOD PRESSURE: 146 MMHG | RESPIRATION RATE: 12 BRPM

## 2025-04-04 LAB — COLONOSCOPY: NORMAL

## 2025-04-04 PROCEDURE — G0121 COLON CA SCRN NOT HI RSK IND: HCPCS | Performed by: COLON & RECTAL SURGERY

## 2025-04-04 PROCEDURE — 45378 DIAGNOSTIC COLONOSCOPY: CPT | Performed by: COLON & RECTAL SURGERY

## 2025-04-04 PROCEDURE — 250N000011 HC RX IP 250 OP 636: Mod: JZ | Performed by: COLON & RECTAL SURGERY

## 2025-04-04 PROCEDURE — G0500 MOD SEDAT ENDO SERVICE >5YRS: HCPCS | Performed by: COLON & RECTAL SURGERY

## 2025-04-04 RX ORDER — FENTANYL CITRATE 50 UG/ML
INJECTION, SOLUTION INTRAMUSCULAR; INTRAVENOUS PRN
Status: DISCONTINUED | OUTPATIENT
Start: 2025-04-04 | End: 2025-04-04 | Stop reason: HOSPADM

## 2025-04-04 ASSESSMENT — ACTIVITIES OF DAILY LIVING (ADL)
ADLS_ACUITY_SCORE: 41
ADLS_ACUITY_SCORE: 41

## 2025-04-04 NOTE — H&P
"Pre-Endoscopy History and Physical     Yg Garza MRN# [id number]   YOB: 1980 Age: 44 year old     Date of Procedure: 4/4/2025  Primary care provider: No Ref-Primary, Physician  Type of Endoscopy: Colonoscopy  Reason for Procedure: screening  Type of Anesthesia Anticipated: Moderate Sedation    HPI:    Yg is a 44 year old male who will be undergoing the above procedure.      A history and physical has been performed. The patient's medications and allergies have been reviewed. The risks and benefits of the procedure and the sedation options and risks were discussed with the patient.  All questions were answered and informed consent was obtained.      He denies a personal or family history of anesthesia complications or bleeding disorders.     No Known Allergies     No current facility-administered medications for this encounter.       Patient Active Problem List   Diagnosis    Seasonal allergic rhinitis    Family history of colon cancer    Chronic right shoulder pain        History reviewed. No pertinent past medical history.     Past Surgical History:   Procedure Laterality Date    testicular torsion surgery  2004       Social History     Tobacco Use    Smoking status: Former     Current packs/day: 0.00     Types: Cigarettes     Quit date: 2/1/2022     Years since quitting: 3.1    Smokeless tobacco: Never   Substance Use Topics    Alcohol use: Not Currently     Comment: 3 beers so far this year-2025       Family History   Problem Relation Age of Onset    Cataracts Mother     Coronary Artery Disease Father         has pacemaker    Abdominal Aortic Aneurysm Father     Colon Cancer Paternal Grandfather         passed away in 60s       REVIEW OF SYSTEMS:     5 point ROS negative except as noted above in HPI, including Gen., Resp., CV, GI &  system review.      PHYSICAL EXAM:   BP (!) 139/93   Pulse 98   Resp 14   Ht 1.791 m (5' 10.5\")   Wt 101.6 kg (224 lb)   SpO2 98%   BMI 31.69 " "kg/m   Estimated body mass index is 31.69 kg/m  as calculated from the following:    Height as of this encounter: 1.791 m (5' 10.5\").    Weight as of this encounter: 101.6 kg (224 lb).   GENERAL APPEARANCE: healthy and alert  MENTAL STATUS: alert  AIRWAY EXAM: Mallampatti Class I (visualization of the soft palate, fauces, uvula, anterior and posterior pillars)  RESP: lungs clear to auscultation - no rales, rhonchi or wheezes  CV: regular rates and rhythm      IMPRESSION   ASA Class 1 - Healthy patient, no medical problems        PLAN:     Plan for colonoscopy. We discussed the risks, benefits and alternatives and the patient wished to proceed.    The above has been forwarded to the consulting provider.      Jonas Pereira MD  [facility]  Phone: [phone]  Fax: [phone]  April 4, 2025    "
